# Patient Record
Sex: MALE | Race: BLACK OR AFRICAN AMERICAN | Employment: FULL TIME | URBAN - METROPOLITAN AREA
[De-identification: names, ages, dates, MRNs, and addresses within clinical notes are randomized per-mention and may not be internally consistent; named-entity substitution may affect disease eponyms.]

---

## 2017-03-11 ENCOUNTER — OFFICE VISIT (OUTPATIENT)
Dept: FAMILY MEDICINE CLINIC | Age: 32
End: 2017-03-11

## 2017-03-11 VITALS
HEIGHT: 71 IN | DIASTOLIC BLOOD PRESSURE: 64 MMHG | WEIGHT: 159 LBS | OXYGEN SATURATION: 100 % | HEART RATE: 62 BPM | SYSTOLIC BLOOD PRESSURE: 113 MMHG | TEMPERATURE: 97.8 F | BODY MASS INDEX: 22.26 KG/M2

## 2017-03-11 DIAGNOSIS — R06.02 SHORTNESS OF BREATH: Primary | ICD-10-CM

## 2017-03-11 DIAGNOSIS — J30.89 ALLERGY TO DUST: ICD-10-CM

## 2017-03-11 RX ORDER — LORATADINE 10 MG/1
10 TABLET ORAL DAILY
Qty: 30 TAB | Refills: 2 | Status: SHIPPED | OUTPATIENT
Start: 2017-03-11 | End: 2020-10-19

## 2017-03-11 RX ORDER — ALBUTEROL SULFATE 90 UG/1
2 AEROSOL, METERED RESPIRATORY (INHALATION)
Qty: 1 INHALER | Refills: 3
Start: 2017-03-11

## 2017-03-11 NOTE — PATIENT INSTRUCTIONS

## 2017-03-11 NOTE — PROGRESS NOTES
Patient seen for discharge to review his AVS, Albuterol and Loratadine prescriptions and the pharmacy location. We also discussed the procedure for obtaining prescriptions from [facility]. He prefers to use the [facility] location and was given the resource sheet for [facility] medication pick-up times. He states he does not have time today to complete the [facility] application, but will take it home and return it to a [facility] clinic next week as a Nurse Visit. He works each day until 2:30pm so he will try to meet with the SW/OW at a clinic if time allows. He recently started a new job and is paid bi-weekly. A copy of his 03-10-17 paycheck was made to send back to [facility] for Aminah Edouard. He understands that if he is not able to meet with a SW/OW on the day he returns the [facility] application, that they will call him to review the application information before sending it in to [facility]. He also understands that it may take three days for his prescription to be ready once [facility] receives his application and that they will call him to let him know that it is ready for pick-up. The patient was also given a Good Rx coupon card and is aware that the albuterol inhaler at [facility] with the coupon is about $54. We also discussed the walk-in x-ray procedure and the Care Card program. He was given list of [facility] facilities to go to for the x-ray as well as the resource sheet for the Care Card program. A copy of his 03-08-17 PFT results was made for the chart and the originals returned to the patient. He will go now to registration to schedule a provider follow-up appointment.  Alveta Blizzard, RN

## 2017-03-11 NOTE — PROGRESS NOTES
Assessment/Plan:    Kahlil was seen today for shortness of breath. Diagnoses and all orders for this visit:    Shortness of breath  -     XR CHEST PA LAT; Future  -     loratadine (CLARITIN) 10 mg tablet; Take 1 Tab by mouth daily. -     albuterol (PROVENTIL HFA, VENTOLIN HFA, PROAIR HFA) 90 mcg/actuation inhaler; Take 2 Puffs by inhalation every six (6) hours as needed for Wheezing. Allergy to dust  -     loratadine (CLARITIN) 10 mg tablet; Take 1 Tab by mouth daily. Continue to stay away from tobacco use and smoke  Wear respirator at work  Take loratadine to help with allergic sxs  Get albuterol from [facility] if he qualifies  RTC 1 month  Scan copy of recent pre albuterol and post albuterol PFT's into his chart    Follow-up Disposition:  Return in about 1 month (around 4/11/2017) for or sooner PRN. Madelyn Man PA-C  Kahlil Wardjamey expressed understanding of this plan. An AVS was printed and given to the patient.      ----------------------------------------------------------------------    Chief Complaint   Patient presents with    Shortness of Breath     referred from working well for SOB and chest pain evaluation. History of Present Illness:    29 yo presents to [facility] as a new pt, referred by occupational medicine after he was sent for respirator mask fitting and he had abnl results on his PFT. He has been using an albuterol inhaler for years- typically his use is once a day. He has had many ER visits over the years for SOB. He states that he has been dx'd with chronic bronchitis at the ER before. He has not had regular, routine primary care visits. He stopped smoking 2 weeks ago. His use was about 1-1 1/2 packs per day of cigarettes for about 16 years. Additionally, there was a lot of marijuana use during that time. He is now using a \"vap\" and overall since he stopped smoking he has been feeling better.    He often coughs at night and he thinks that his exposure to dust in the [employer] where he works has also contributed to the shortness of breath. He notes that the SOB is with heavy exertion and running or climbing ladders/stairs. As mentioned previously, he only uses 2 puffs of his albuterol once a day. His son has severe asthma and the pt will use his sons albuterol inhaler or he will buy one with the rx's he has received from the ER. Last cxr? [facility]    No past medical history on file. Current Outpatient Prescriptions   Medication Sig Dispense Refill    loratadine (CLARITIN) 10 mg tablet Take 1 Tab by mouth daily. 30 Tab 2    albuterol (PROVENTIL HFA, VENTOLIN HFA, PROAIR HFA) 90 mcg/actuation inhaler Take 2 Puffs by inhalation every six (6) hours as needed for Wheezing. 1 Inhaler 3       No Known Allergies    Social History   Substance Use Topics    Smoking status: Former Smoker    Smokeless tobacco: None    Alcohol use No       No family history on file.     Physical Exam:     Visit Vitals    /64 (BP 1 Location: Right arm, BP Patient Position: Sitting)    Pulse 62    Temp 97.8 °F (36.6 °C) (Oral)    Ht 5' 11.26\" (1.81 m)    Wt 159 lb (72.1 kg)    SpO2 100%    BMI 22.01 kg/m2   looks well, thin, pleasant, not SOB    A&Ox3  WDWN NAD  Respirations normal and non labored  Lungs are CTA alix today on exam  Cor RRR s1s2

## 2017-03-22 ENCOUNTER — DOCUMENTATION ONLY (OUTPATIENT)
Dept: FAMILY MEDICINE CLINIC | Age: 32
End: 2017-03-22

## 2019-11-04 ENCOUNTER — ED HISTORICAL/CONVERTED ENCOUNTER (OUTPATIENT)
Dept: OTHER | Age: 34
End: 2019-11-04

## 2020-10-19 ENCOUNTER — HOSPITAL ENCOUNTER (EMERGENCY)
Age: 35
Discharge: HOME | End: 2020-10-19
Attending: EMERGENCY MEDICINE
Payer: COMMERCIAL

## 2020-10-19 VITALS
WEIGHT: 172 LBS | HEART RATE: 72 BPM | TEMPERATURE: 98.3 F | DIASTOLIC BLOOD PRESSURE: 70 MMHG | SYSTOLIC BLOOD PRESSURE: 117 MMHG | HEIGHT: 73 IN | BODY MASS INDEX: 22.8 KG/M2 | RESPIRATION RATE: 16 BRPM | OXYGEN SATURATION: 98 %

## 2020-10-19 DIAGNOSIS — L72.3 SEBACEOUS CYST: Primary | ICD-10-CM

## 2020-10-19 DIAGNOSIS — D17.0 LIPOMA OF FACE: ICD-10-CM

## 2020-10-19 PROCEDURE — 99282 EMERGENCY DEPT VISIT SF MDM: CPT

## 2020-10-19 NOTE — ED PROVIDER NOTES
EMERGENCY DEPARTMENT HISTORY AND PHYSICAL EXAM      Date: 10/19/2020  Patient Name: Erin Puga      History of Presenting Illness     Chief Complaint   Patient presents with    Skin Problem       History Provided By: Patient    HPI: Erin Puga, 28 y.o. male with a past medical history significant No significant past medical history presents to the ED with cc of Facial cyst for 2.5 months    There are no other complaints, changes, or physical findings at this time. PCP: None    Current Outpatient Medications   Medication Sig Dispense Refill    albuterol (PROVENTIL HFA, VENTOLIN HFA, PROAIR HFA) 90 mcg/actuation inhaler Take 2 Puffs by inhalation every six (6) hours as needed for Wheezing. 1 Inhaler 3       Past History     Past Medical History:  No past medical history on file. Past Surgical History:  No past surgical history on file. Family History:  No family history on file. Social History:  Social History     Tobacco Use    Smoking status: Former Smoker    Smokeless tobacco: Never Used   Substance Use Topics    Alcohol use: No    Drug use: No       Allergies:  No Known Allergies      Review of Systems     Review of Systems   Constitutional: Negative. HENT:        Cyst between eyebrows and on nasal bridge getting bigger for 2.5 months. Saw PCP who treated for sinus infection but no better. Eyes: Negative. Respiratory: Negative. Cardiovascular: Negative. Gastrointestinal: Negative. Endocrine: Negative. Genitourinary: Negative. Musculoskeletal: Negative. Neurological: Negative. All other systems reviewed and are negative. Physical Exam     Physical Exam  Vitals signs and nursing note reviewed. Constitutional:       Appearance: Normal appearance. He is normal weight. HENT:      Head: Normocephalic. Comments: Cyst 2 cm soft, non tender non fluctuant cystic swelling which is mobile  Eyes:      Pupils: Pupils are equal, round, and reactive to light. Neck:      Musculoskeletal: Neck supple. Cardiovascular:      Rate and Rhythm: Normal rate and regular rhythm. Pulses: Normal pulses. Heart sounds: Normal heart sounds. Pulmonary:      Effort: Pulmonary effort is normal.      Breath sounds: Normal breath sounds. Skin:     General: Skin is warm and dry. Neurological:      General: No focal deficit present. Mental Status: He is alert and oriented to person, place, and time. Diagnostic Study Results     Labs -   No results found for this or any previous visit (from the past 12 hour(s)). Radiologic Studies -   [unfilled]  CT Results  (Last 48 hours)    None        CXR Results  (Last 48 hours)    None          Medical Decision Making and ED Course     Vital Signs-Reviewed the patient's vital signs. Patient Vitals for the past 12 hrs:   Temp Pulse Resp BP SpO2   10/19/20 1010 98.3 °F (36.8 °C) 72 16 117/70 98 %       Records Reviewed: Nursing Notes    The patient presents with       Provider Notes (Medical Decision Making):     [facility]       ED Course:     - I am the first and primary provider for this patient. - I reviewed the vital signs, available nursing notes, past medical history, past surgical history, family history and social history. Initial assessment performed. The patients presenting problems have been discussed, and they are in agreement with the care plan formulated and outlined with them. I have encouraged them to ask questions as they arise throughout their visit. CONSULTATIONS:          Dharmesh Mathew MD  Procedures               Coretat Mathew MD        Disposition       Discharged    Remove if not discharged  DISCHARGE PLAN:  1.    Current Discharge Medication List      CONTINUE these medications which have NOT CHANGED    Details   albuterol (PROVENTIL HFA, VENTOLIN HFA, PROAIR HFA) 90 mcg/actuation inhaler Take 2 Puffs by inhalation every six (6) hours as needed for Wheezing. Qty: 1 Inhaler, Refills: 3    Associated Diagnoses: Shortness of breath           2. Follow-up Information     Follow up With Specialties Details Why Contact Info    Belkis Sharma MD Surgery In 2 days  [address] [facility]  [phone]          3. Return to ED if worse   4. Current Discharge Medication List          Diagnosis     Clinical Impression:   1. Sebaceous cyst    2. Lipoma of face        Attestations:    Natalie Tim MD    Please note that this dictation was completed with [employer], the computer voice recognition software. Quite often unanticipated grammatical, syntax, homophones, and other interpretive errors are inadvertently transcribed by the computer software. Please disregard these errors. Please excuse any errors that have escaped final proofreading. Thank you.

## 2020-10-19 NOTE — ED NOTES
Dc instructions gone over with pt, pt verbalized understanding, pt ambulated out of ecc no distress noted

## 2021-10-05 ENCOUNTER — HOSPITAL ENCOUNTER (EMERGENCY)
Age: 36
Discharge: HOME | End: 2021-10-05
Attending: STUDENT IN AN ORGANIZED HEALTH CARE EDUCATION/TRAINING PROGRAM
Payer: COMMERCIAL

## 2021-10-05 ENCOUNTER — APPOINTMENT (OUTPATIENT)
Dept: GENERAL RADIOLOGY | Age: 36
End: 2021-10-05
Attending: STUDENT IN AN ORGANIZED HEALTH CARE EDUCATION/TRAINING PROGRAM
Payer: COMMERCIAL

## 2021-10-05 VITALS
SYSTOLIC BLOOD PRESSURE: 104 MMHG | HEART RATE: 66 BPM | BODY MASS INDEX: 23.3 KG/M2 | RESPIRATION RATE: 16 BRPM | WEIGHT: 172 LBS | HEIGHT: 72 IN | TEMPERATURE: 98.8 F | OXYGEN SATURATION: 99 % | DIASTOLIC BLOOD PRESSURE: 62 MMHG

## 2021-10-05 DIAGNOSIS — M62.830 BACK MUSCLE SPASM: Primary | ICD-10-CM

## 2021-10-05 LAB
ATRIAL RATE: 63 BPM
CALCULATED P AXIS, ECG09: 71 DEGREES
CALCULATED R AXIS, ECG10: 68 DEGREES
CALCULATED T AXIS, ECG11: 69 DEGREES
DIAGNOSIS, 93000: NORMAL
P-R INTERVAL, ECG05: 168 MS
Q-T INTERVAL, ECG07: 406 MS
QRS DURATION, ECG06: 86 MS
QTC CALCULATION (BEZET), ECG08: 415 MS
VENTRICULAR RATE, ECG03: 63 BPM

## 2021-10-05 PROCEDURE — 74011250637 HC RX REV CODE- 250/637: Performed by: STUDENT IN AN ORGANIZED HEALTH CARE EDUCATION/TRAINING PROGRAM

## 2021-10-05 PROCEDURE — 93005 ELECTROCARDIOGRAM TRACING: CPT

## 2021-10-05 PROCEDURE — 99283 EMERGENCY DEPT VISIT LOW MDM: CPT

## 2021-10-05 PROCEDURE — 71046 X-RAY EXAM CHEST 2 VIEWS: CPT

## 2021-10-05 RX ORDER — OXYCODONE AND ACETAMINOPHEN 5; 325 MG/1; MG/1
1 TABLET ORAL
Status: COMPLETED | OUTPATIENT
Start: 2021-10-05 | End: 2021-10-05

## 2021-10-05 RX ORDER — CYCLOBENZAPRINE HCL 10 MG
10 TABLET ORAL
Qty: 20 TABLET | Refills: 0 | Status: SHIPPED | OUTPATIENT
Start: 2021-10-05

## 2021-10-05 RX ORDER — CYCLOBENZAPRINE HCL 10 MG
10 TABLET ORAL
Status: COMPLETED | OUTPATIENT
Start: 2021-10-05 | End: 2021-10-05

## 2021-10-05 RX ORDER — IBUPROFEN 400 MG/1
600 TABLET ORAL
Qty: 20 TABLET | Refills: 0 | Status: SHIPPED | OUTPATIENT
Start: 2021-10-05

## 2021-10-05 RX ADMIN — OXYCODONE HYDROCHLORIDE AND ACETAMINOPHEN 1 TABLET: 5; 325 TABLET ORAL at 14:34

## 2021-10-05 RX ADMIN — CYCLOBENZAPRINE 10 MG: 10 TABLET, FILM COATED ORAL at 14:34

## 2021-10-05 NOTE — ED TRIAGE NOTES
Reports swelling below L scapula which he awakened c this AM. Reports history of back pain, muscle spasms etc. Person with him states he has been lightheaded for a couple of days. Denies vomiting or diarrhea. Denies any recent injury.

## 2021-10-05 NOTE — LETTER
Bobby Hernández was seen and treated in our emergency department on 10/5/2021. He may return to work on 10/7/21. If you have any questions or concerns, please don't hesitate to call.       Dr. Freddie Engel M.D.

## 2021-10-05 NOTE — LETTER
[facility] EMERGENCY DEPT  [facility] [place] [address]  [phone]    Work/School Note    Date: 10/5/2021    To Whom It May concern:    Sri Polanco was seen and treated today in the emergency room by the following provider(s):  Attending Provider: Alvin Beaulieu MD.      Sri Polanco is excused from work/school on 10/5/2021 through 10/8/2021. He is medically clear to return to work/school on 10/9/2021.         Sincerely,          Dr Arminda Bray

## 2021-10-05 NOTE — LETTER
Greald Jernigan was seen and treated in our emergency department on 10/5/2021. He may return to work on 10/8/21    If you have any questions or concerns, please don't hesitate to call.       Dr. Alessandra Marshall MD

## 2021-10-05 NOTE — ED PROVIDER NOTES
EMERGENCY DEPARTMENT HISTORY AND PHYSICAL EXAM      Date: 10/5/2021  Patient Name: Cari Rondon    History of Presenting Illness     Chief Complaint   Patient presents with    Back Pain       History Provided By: Patient    HPI: Cari Rondon, 39 y.o. male with a past medical history significant No significant past medical history presents to the ED with cc of left scapular pain. Patient states over the last 2 days has noticed worsening pain to the left scapula, radiating to left side of chest.  Patient denies any recent strain or heavy lifting. Patient states he has increased difficulty raising his arm secondary to pain. Patient denies any shortness of breath denies any fevers chills denies any abdominal pain or nausea vomiting. No known cardiac history, denies any drug alcohol or tobacco use. There are no other complaints, changes, or physical findings at this time. PCP: Cj Jean MD    No current facility-administered medications on file prior to encounter. Current Outpatient Medications on File Prior to Encounter   Medication Sig Dispense Refill    albuterol (PROVENTIL HFA, VENTOLIN HFA, PROAIR HFA) 90 mcg/actuation inhaler Take 2 Puffs by inhalation every six (6) hours as needed for Wheezing. 1 Inhaler 3       Past History     Past Medical History:  No past medical history on file. Past Surgical History:  No past surgical history on file. Family History:  No family history on file. Social History:  Social History     Tobacco Use    Smoking status: Former Smoker    Smokeless tobacco: Never Used   Substance Use Topics    Alcohol use: No    Drug use: No       Allergies:  No Known Allergies      Review of Systems     Review of Systems   Constitutional: Negative for activity change, appetite change, chills and fever. HENT: Negative for congestion, sore throat and trouble swallowing. Eyes: Negative for photophobia and visual disturbance.    Respiratory: Negative for cough, chest tightness and shortness of breath. Cardiovascular: Negative for chest pain and palpitations. Gastrointestinal: Negative for abdominal pain and nausea. Genitourinary: Negative for dysuria and flank pain. Musculoskeletal: Positive for arthralgias and back pain. Negative for neck pain. Skin: Negative for color change and pallor. Neurological: Negative for dizziness, weakness, numbness and headaches. Physical Exam     Physical Exam  Vitals and nursing note reviewed. Constitutional:       Appearance: Normal appearance. He is normal weight. HENT:      Head: Normocephalic and atraumatic. Nose: Nose normal.      Mouth/Throat:      Mouth: Mucous membranes are moist.   Eyes:      Extraocular Movements: Extraocular movements intact. Pupils: Pupils are equal, round, and reactive to light. Cardiovascular:      Rate and Rhythm: Normal rate and regular rhythm. Pulses: Normal pulses. Heart sounds: Normal heart sounds. No murmur heard. No friction rub. Pulmonary:      Effort: Pulmonary effort is normal.      Breath sounds: Normal breath sounds. Abdominal:      General: Abdomen is flat. Bowel sounds are normal.      Palpations: Abdomen is soft. Musculoskeletal:         General: No swelling or tenderness. Normal range of motion. Left upper arm: No swelling, edema or deformity. Cervical back: Normal range of motion and neck supple. Back:    Skin:     General: Skin is warm and dry. Capillary Refill: Capillary refill takes less than 2 seconds. Neurological:      General: No focal deficit present. Mental Status: He is alert and oriented to person, place, and time. Cranial Nerves: No cranial nerve deficit. Sensory: No sensory deficit. Motor: No weakness.    Psychiatric:         Mood and Affect: Mood normal.         Behavior: Behavior normal.         Diagnostic Study Results     Labs -     Recent Results (from the past 12 hour(s))   EKG, 12 LEAD, INITIAL    Collection Time: 10/05/21 11:52 AM   Result Value Ref Range    Ventricular Rate 63 BPM    Atrial Rate 63 BPM    P-R Interval 168 ms    QRS Duration 86 ms    Q-T Interval 406 ms    QTC Calculation (Bezet) 415 ms    Calculated P Axis 71 degrees    Calculated R Axis 68 degrees    Calculated T Axis 69 degrees    Diagnosis       Normal sinus rhythm  Normal ECG    Confirmed by Lottie Marin ([id number]) on 10/5/2021 5:16:40 PM         Radiologic Studies -   @lastxrresult@  CT Results  (Last 48 hours)    None        CXR Results  (Last 48 hours)               10/05/21 1226  XR CHEST PA LAT Final result    Impression:  No plain film evidence for CHF or pneumonia. Narrative:  Chest 2 views. Lungs clear; no interstitial or alveolar pulmonary edema. Cardiac and mediastinal structures within normal limits. No pneumothorax or sizable pleural effusion. Normal alignment of the thoracic vertebral column; no plain film evidence for   thoracic vertebral body compression deformity or excess degenerative change. Medical Decision Making   I am the first provider for this patient. I reviewed the vital signs, available nursing notes, past medical history, past surgical history, family history and social history. Vital Signs-Reviewed the patient's vital signs. Patient Vitals for the past 12 hrs:   Temp Pulse Resp BP SpO2   10/05/21 1147 98.8 °F (37.1 °C) 66 16 104/62 99 %       EKG: Normal sinus rhythm 63, no ST elevations, normal axis      Records Reviewed: Nursing Notes and Old Medical Records    The patient presents with back pain, scapular pain with a differential diagnosis of muscle spasm, periscapular muscular strain, scapular injury, pneumothorax, PE, ACS      Provider Notes (Medical Decision Making):     MDM   55-year-old male, no significant past medical history presents to emergency department for 2 days of left scapular pain.     Patient denies any recent injury denies any recent heavy lifting or strain. Physical exam shows uncomfortable male, however no distress, stable vitals, clear breath sounds bilateral normal cardiovascular exam, intact distal pulses. Left medial border of scapula is significantly tender to any palpation, full range of motion of left shoulder however patient cannot range secondary to pain in scapula. Patient has no cardiac illness, no risk factors for cardiac disease, EKG shows no signs of ischemic changes. Patient's well score 0, PERC negative, do not feel the patient requires blood work at this time. Chest x-ray ordered at triage, reviewed by myself and radiology no signs of acute cardiopulmonary injury, no pneumothorax no pleural effusion normal cardiac shadow    We will treat patient for muscle spasm with Flexeril and Percocet. ED Course:   Initial assessment performed. The patients presenting problems have been discussed, and they are in agreement with the care plan formulated and outlined with them. I have encouraged them to ask questions as they arise throughout their visit. PROCEDURES  Procedures         PLAN:  1. Discharge Medication List as of 10/5/2021  2:56 PM      START taking these medications    Details   lidocaine HCL 4 % ptmd 1 Patch by Apply Externally route two (2) times a day., Normal, Disp-12 Each, R-0      cyclobenzaprine (FLEXERIL) 10 mg tablet Take 1 Tablet by mouth three (3) times daily as needed for Muscle Spasm(s). , Normal, Disp-20 Tablet, R-0      ibuprofen (MOTRIN) 400 mg tablet Take 1.5 Tablets by mouth every six (6) hours as needed for Pain., Normal, Disp-20 Tablet, R-0         CONTINUE these medications which have NOT CHANGED    Details   albuterol (PROVENTIL HFA, VENTOLIN HFA, PROAIR HFA) 90 mcg/actuation inhaler Take 2 Puffs by inhalation every six (6) hours as needed for Wheezing., No Print, Disp-1 Inhaler, R-3           2.    Follow-up Information     Follow up With Specialties Details Why Contact Juarez Amaya MD Internal Medicine   [address] [id number]  [phone]      [facility] EMERGENCY DEPT Emergency Medicine  If symptoms worsen Jacoby Mckinney   [phone]        Return to ED if worse     Diagnosis     Clinical Impression:   1.  Back muscle spasm

## 2021-12-21 ENCOUNTER — TRANSCRIBE ORDER (OUTPATIENT)
Dept: REGISTRATION | Age: 36
End: 2021-12-21

## 2021-12-21 ENCOUNTER — HOSPITAL ENCOUNTER (OUTPATIENT)
Dept: GENERAL RADIOLOGY | Age: 36
Discharge: HOME | End: 2021-12-21
Payer: COMMERCIAL

## 2021-12-21 DIAGNOSIS — M25.512 LEFT SHOULDER PAIN: Primary | ICD-10-CM

## 2021-12-21 DIAGNOSIS — M25.512 LEFT SHOULDER PAIN: ICD-10-CM

## 2021-12-21 PROCEDURE — 73030 X-RAY EXAM OF SHOULDER: CPT

## 2022-05-11 ENCOUNTER — HOSPITAL ENCOUNTER (EMERGENCY)
Age: 37
Discharge: HOME | End: 2022-05-11
Attending: EMERGENCY MEDICINE
Payer: COMMERCIAL

## 2022-05-11 VITALS
BODY MASS INDEX: 22.08 KG/M2 | HEART RATE: 88 BPM | HEIGHT: 72 IN | WEIGHT: 163 LBS | DIASTOLIC BLOOD PRESSURE: 76 MMHG | OXYGEN SATURATION: 99 % | TEMPERATURE: 98.4 F | RESPIRATION RATE: 18 BRPM | SYSTOLIC BLOOD PRESSURE: 123 MMHG

## 2022-05-11 DIAGNOSIS — G57.03 COMPRESSION OF SCIATIC NERVE, BILATERAL: Primary | ICD-10-CM

## 2022-05-11 PROCEDURE — 74011250637 HC RX REV CODE- 250/637: Performed by: EMERGENCY MEDICINE

## 2022-05-11 PROCEDURE — 99283 EMERGENCY DEPT VISIT LOW MDM: CPT

## 2022-05-11 RX ORDER — NAPROXEN 500 MG/1
500 TABLET ORAL
Status: COMPLETED | OUTPATIENT
Start: 2022-05-11 | End: 2022-05-11

## 2022-05-11 RX ORDER — NAPROXEN 500 MG/1
500 TABLET ORAL 2 TIMES DAILY WITH MEALS
Qty: 20 TABLET | Refills: 0 | Status: SHIPPED | OUTPATIENT
Start: 2022-05-11 | End: 2022-05-21

## 2022-05-11 RX ORDER — METHOCARBAMOL 500 MG/1
750 TABLET, FILM COATED ORAL
Status: COMPLETED | OUTPATIENT
Start: 2022-05-11 | End: 2022-05-11

## 2022-05-11 RX ORDER — METHYLPREDNISOLONE 4 MG/1
TABLET ORAL
Qty: 1 DOSE PACK | Refills: 0 | Status: SHIPPED | OUTPATIENT
Start: 2022-05-11

## 2022-05-11 RX ORDER — METHOCARBAMOL 500 MG/1
500 TABLET, FILM COATED ORAL 3 TIMES DAILY
Qty: 20 TABLET | Refills: 0 | Status: SHIPPED | OUTPATIENT
Start: 2022-05-11

## 2022-05-11 RX ADMIN — NAPROXEN 500 MG: 500 TABLET ORAL at 12:48

## 2022-05-11 RX ADMIN — METHOCARBAMOL 750 MG: 500 TABLET ORAL at 12:48

## 2022-05-11 NOTE — Clinical Note
[facility] EMERGENCY DEPARTMENT  [address] [facility] [address]  [phone]    Work/School Note    Date: 5/11/2022    To Whom It May concern:      Amadeo Gaviria was seen and treated today in the emergency room by the following provider(s):  Attending Provider: Natalia Jhaveri MD.      Amadeo Gaviria is excused from work/school on 05/11/22. He is clear to return to work/school on 05/12/22.         Sincerely,          Fatimah Frost MD

## 2022-05-11 NOTE — Clinical Note
[facility] [facility] EMERGENCY DEPARTMENT  [address] [facility] [address]  [phone]    Work/School Note    Date: 5/11/2022    To Whom It May concern:    Kelsey Haddad was seen and treated today in the emergency room by the following provider(s):  Attending Provider: Vicky Cohen MD.      Kelsey Haddad is excused from work/school on 05/11/22 and 05/12/22. He is medically clear to return to work/school on 5/13/2022.        Sincerely,          John Cartagena MD

## 2022-05-11 NOTE — ED TRIAGE NOTES
Pt reports that he has been having lower back pain that has been radiating down both of his legs. Pt reports that the pain also radiates to his groin.

## 2022-05-11 NOTE — ED PROVIDER NOTES
EMERGENCY DEPARTMENT HISTORY AND PHYSICAL EXAM      Date: 5/11/2022  Patient Name: Kenya Desai  Patient Age and Sex: 40 y.o. male     History of Presenting Illness     Chief Complaint   Patient presents with    Back Pain       History Provided By: Patient    HPI: Kenya Desai is a 80-year-old male with a history of chronic back pain, sciatica, presenting with back pain. Patient states that for the past 3 days has been having lower back pain and spasms bilaterally with pain radiating down his bilateral legs. 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 Denies any trauma to his back. States that the pain is also radiating not just his legs but also down into his groin. He denies any saddle anesthesia, dysuria, fevers. Has been taking Motrin and applying heat and doing exercise. Sees his primary care doctor regularly but does not have a dedicated spine physician. States this all started after football injury many years ago    There are no other complaints, changes, or physical findings at this time. PCP: Bi Zuniga MD    No current facility-administered medications on file prior to encounter. Current Outpatient Medications on File Prior to Encounter   Medication Sig Dispense Refill    lidocaine HCL 4 % ptmd 1 Patch by Apply Externally route two (2) times a day. 12 Each 0    cyclobenzaprine (FLEXERIL) 10 mg tablet Take 1 Tablet by mouth three (3) times daily as needed for Muscle Spasm(s). 20 Tablet 0    ibuprofen (MOTRIN) 400 mg tablet Take 1.5 Tablets by mouth every six (6) hours as needed for Pain. 20 Tablet 0    albuterol (PROVENTIL HFA, VENTOLIN HFA, PROAIR HFA) 90 mcg/actuation inhaler Take 2 Puffs by inhalation every six (6) hours as needed for Wheezing. 1 Inhaler 3       Past History     Past Medical History:  History reviewed. No pertinent past medical history. Past Surgical History:  History reviewed.  No pertinent surgical history. Family History:  History reviewed. No pertinent family history. Social History:  Social History     Tobacco Use    Smoking status: Former Smoker    Smokeless tobacco: Never Used   Substance Use Topics    Alcohol use: No    Drug use: Yes     Types: Marijuana       Allergies:  No Known Allergies      Review of Systems   Review of Systems   Constitutional: Negative for chills and fever. Respiratory: Negative for cough and shortness of breath. Cardiovascular: Negative for chest pain. Gastrointestinal: Negative for abdominal pain, constipation, diarrhea, nausea and vomiting. Genitourinary: Negative for dysuria, frequency and hematuria. Musculoskeletal: Positive for back pain and myalgias. Neurological: Positive for numbness. Negative for weakness. All other systems reviewed and are negative. Physical Exam   Physical Exam  Vitals and nursing note reviewed. Constitutional:       Appearance: He is well-developed. HENT:      Head: Normocephalic and atraumatic. Nose: Nose normal.      Mouth/Throat:      Mouth: Mucous membranes are moist.   Eyes:      Extraocular Movements: Extraocular movements intact. Conjunctiva/sclera: Conjunctivae normal.   Cardiovascular:      Rate and Rhythm: Normal rate and regular rhythm. Pulmonary:      Effort: Pulmonary effort is normal. No respiratory distress. Breath sounds: Normal breath sounds. Abdominal:      General: There is no distension. Palpations: Abdomen is soft. Tenderness: There is no abdominal tenderness. Musculoskeletal:         General: Normal range of motion. Cervical back: Normal range of motion and neck supple. Comments: Patient able to ambulate. Positive straight leg sign on bilateral legs. Some tenderness over the right lower back no tenderness over the spine   Skin:     General: Skin is warm and dry. Neurological:      General: No focal deficit present.       Mental Status: He is alert and oriented to person, place, and time. Mental status is at baseline. Psychiatric:         Mood and Affect: Mood normal.          Diagnostic Study Results     Labs -   No results found for this or any previous visit (from the past 12 hour(s)). Radiologic Studies -   No orders to display     CT Results  (Last 48 hours)    None        CXR Results  (Last 48 hours)    None            Medical Decision Making   I am the first provider for this patient. I reviewed the vital signs, available nursing notes, past medical history, past surgical history, family history and social history. Vital Signs-Reviewed the patient's vital signs. Patient Vitals for the past 12 hrs:   Temp Pulse Resp BP SpO2   05/11/22 1226 98.4 °F (36.9 °C) 88 18 123/76 99 %       Records Reviewed: Nursing Notes and Old Medical Records    Provider Notes (Medical Decision Making): The patient complains of low back pain radiating down leg with positive straight leg sign. These symptoms are consistent with sciatica. Less likely  pathology, aortic dissection or AAA, or cauda equina given that there are no red flags and a benign physical exam.     I have recommended rest, avoiding heavy lifting until better, use of intermittent heat (avoid sleeping on a heating pad), and use of OTC NSAID's (Advil, Aleve etc) or Tylenol prn for pain and steroids PRN. Given list of sciatica Exercises. Call PCP if back pain persists or he develops leg symptoms. It has also been explained that this may take up to three months to fully resolved and that smoking may slow that process even more. ED Course:   Initial assessment performed. The patients presenting problems have been discussed, and they are in agreement with the care plan formulated and outlined with them. I have encouraged them to ask questions as they arise throughout their visit.        Critical Care Time:   0    Disposition:  Discharge Note:  The patient has been re-evaluated and is ready for discharge. Reviewed available results with patient. Counseled patient on diagnosis and care plan. Patient has expressed understanding, and all questions have been answered. Patient agrees with plan and agrees to follow up as recommended, or to return to the ED if their symptoms worsen. Discharge instructions have been provided and explained to the patient, along with reasons to return to the ED. PLAN:  Current Discharge Medication List      START taking these medications    Details   methocarbamoL (ROBAXIN) 500 mg tablet Take 1 Tablet by mouth three (3) times daily. Qty: 20 Tablet, Refills: 0  Start date: 5/11/2022      naproxen (Naprosyn) 500 mg tablet Take 1 Tablet by mouth two (2) times daily (with meals) for 10 days. Qty: 20 Tablet, Refills: 0  Start date: 5/11/2022, End date: 5/21/2022      methylPREDNISolone (Medrol, Hang,) 4 mg tablet As directed  Qty: 1 Dose Pack, Refills: 0  Start date: 5/11/2022         1.   2.   Follow-up Information     Follow up With Specialties Details Why Contact Info    Ching Harry MD Internal Medicine Physician  As needed [address]   [phone]      George Kam MD Orthopedic Surgery Schedule an appointment as soon as possible for a visit   [address] [place] [address]  [phone]          3. Return to ED if worse     Diagnosis     Clinical Impression:   1. Compression of sciatic nerve, bilateral        Attestations:    Fatimah Frost M.D. Please note that this dictation was completed with [employer], the computer voice recognition software. Quite often unanticipated grammatical, syntax, homophones, and other interpretive errors are inadvertently transcribed by the computer software. Please disregard these errors. Please excuse any errors that have escaped final proofreading. Thank you.

## 2022-05-20 ENCOUNTER — APPOINTMENT (OUTPATIENT)
Dept: GENERAL RADIOLOGY | Age: 37
End: 2022-05-20
Attending: STUDENT IN AN ORGANIZED HEALTH CARE EDUCATION/TRAINING PROGRAM
Payer: COMMERCIAL

## 2022-05-20 ENCOUNTER — HOSPITAL ENCOUNTER (EMERGENCY)
Age: 37
Discharge: HOME | End: 2022-05-20
Attending: STUDENT IN AN ORGANIZED HEALTH CARE EDUCATION/TRAINING PROGRAM
Payer: COMMERCIAL

## 2022-05-20 VITALS
RESPIRATION RATE: 15 BRPM | DIASTOLIC BLOOD PRESSURE: 62 MMHG | HEART RATE: 78 BPM | SYSTOLIC BLOOD PRESSURE: 100 MMHG | BODY MASS INDEX: 22.08 KG/M2 | WEIGHT: 163 LBS | TEMPERATURE: 98.6 F | HEIGHT: 72 IN | OXYGEN SATURATION: 99 %

## 2022-05-20 DIAGNOSIS — R07.89 CHEST DISCOMFORT: Primary | ICD-10-CM

## 2022-05-20 DIAGNOSIS — R20.0 ARM NUMBNESS LEFT: ICD-10-CM

## 2022-05-20 PROCEDURE — 93005 ELECTROCARDIOGRAM TRACING: CPT

## 2022-05-20 PROCEDURE — 99284 EMERGENCY DEPT VISIT MOD MDM: CPT

## 2022-05-20 PROCEDURE — 71045 X-RAY EXAM CHEST 1 VIEW: CPT

## 2022-05-21 LAB
ATRIAL RATE: 66 BPM
CALCULATED P AXIS, ECG09: 57 DEGREES
CALCULATED R AXIS, ECG10: 57 DEGREES
CALCULATED T AXIS, ECG11: 68 DEGREES
DIAGNOSIS, 93000: NORMAL
P-R INTERVAL, ECG05: 160 MS
Q-T INTERVAL, ECG07: 408 MS
QRS DURATION, ECG06: 90 MS
QTC CALCULATION (BEZET), ECG08: 427 MS
VENTRICULAR RATE, ECG03: 66 BPM

## 2022-05-21 NOTE — ED PROVIDER NOTES
EMERGENCY DEPARTMENT HISTORY AND PHYSICAL EXAM      Date: 5/20/2022  Patient Name: Amadeo Gaviria    History of Presenting Illness     Chief Complaint   Patient presents with    Arm Pain       History Provided By: Patient    HPI: Amadeo Gaviria, 40 y.o. male with a past medical history significant No significant past medical history presents to the ED with cc of chest heaviness, arm pain. Patient states over the last week he has had intermittent numbness tingling to his left arm with chest pressure sensation. Patient denies any exacerbating or alleviating factors nonexertional.  Patient states sometimes feels numbness to his right arm as well. No known cardiac history, denies any alcohol or drug use. There are no other complaints, changes, or physical findings at this time. PCP: Ching Harry MD    No current facility-administered medications on file prior to encounter. Current Outpatient Medications on File Prior to Encounter   Medication Sig Dispense Refill    methocarbamoL (ROBAXIN) 500 mg tablet Take 1 Tablet by mouth three (3) times daily. 20 Tablet 0    naproxen (Naprosyn) 500 mg tablet Take 1 Tablet by mouth two (2) times daily (with meals) for 10 days. 20 Tablet 0    methylPREDNISolone (Medrol, Hang,) 4 mg tablet As directed 1 Dose Pack 0    lidocaine HCL 4 % ptmd 1 Patch by Apply Externally route two (2) times a day. 12 Each 0    cyclobenzaprine (FLEXERIL) 10 mg tablet Take 1 Tablet by mouth three (3) times daily as needed for Muscle Spasm(s). 20 Tablet 0    ibuprofen (MOTRIN) 400 mg tablet Take 1.5 Tablets by mouth every six (6) hours as needed for Pain. 20 Tablet 0    albuterol (PROVENTIL HFA, VENTOLIN HFA, PROAIR HFA) 90 mcg/actuation inhaler Take 2 Puffs by inhalation every six (6) hours as needed for Wheezing. 1 Inhaler 3       Past History     Past Medical History:  No past medical history on file. Past Surgical History:  No past surgical history on file.     Family History:  No family history on file. Social History:  Social History     Tobacco Use    Smoking status: Former Smoker    Smokeless tobacco: Never Used   Vaping Use    Vaping Use: Never used   Substance Use Topics    Alcohol use: No    Drug use: Yes     Types: Marijuana       Allergies:  No Known Allergies      Review of Systems     Review of Systems   Constitutional: Negative for activity change, appetite change, chills and fever. HENT: Negative for congestion, sore throat and trouble swallowing. Eyes: Negative for photophobia and visual disturbance. Respiratory: Positive for chest tightness. Negative for cough and shortness of breath. Cardiovascular: Negative for chest pain and palpitations. Gastrointestinal: Negative for abdominal pain and nausea. Genitourinary: Negative for dysuria and flank pain. Musculoskeletal: Positive for myalgias. Negative for arthralgias and neck pain. Skin: Negative for color change and pallor. Neurological: Positive for dizziness. Negative for weakness, numbness and headaches. Physical Exam     Physical Exam  Vitals and nursing note reviewed. Constitutional:       Appearance: Normal appearance. He is normal weight. HENT:      Head: Normocephalic and atraumatic. Nose: Nose normal.      Mouth/Throat:      Mouth: Mucous membranes are moist.   Eyes:      Extraocular Movements: Extraocular movements intact. Pupils: Pupils are equal, round, and reactive to light. Cardiovascular:      Rate and Rhythm: Normal rate and regular rhythm. Pulses: Normal pulses. Heart sounds: Normal heart sounds. Pulmonary:      Effort: Pulmonary effort is normal.      Breath sounds: Normal breath sounds. Abdominal:      General: Abdomen is flat. Bowel sounds are normal.      Palpations: Abdomen is soft. Musculoskeletal:         General: No swelling or tenderness. Normal range of motion. Cervical back: Normal range of motion and neck supple.    Skin: General: Skin is warm and dry. Capillary Refill: Capillary refill takes less than 2 seconds. Neurological:      General: No focal deficit present. Mental Status: He is alert and oriented to person, place, and time. Cranial Nerves: No cranial nerve deficit. Sensory: No sensory deficit. Motor: No weakness. Psychiatric:         Mood and Affect: Mood normal.         Behavior: Behavior normal.         Diagnostic Study Results     Labs -   No results found for this or any previous visit (from the past 12 hour(s)). Radiologic Studies -   @lastxrresult@  CT Results  (Last 48 hours)    None        CXR Results  (Last 48 hours)               05/20/22 2230  XR CHEST PORT Final result    Impression:  Negative. Narrative:  Upright frontal view of the chest compared to 10/5/2021. Heart size is normal.   Lungs are clear of infiltrate. No pneumothorax or pleural effusion is seen. No   bony abnormalities are identified. Medical Decision Making   I am the first provider for this patient. I reviewed the vital signs, available nursing notes, past medical history, past surgical history, family history and social history. Vital Signs-Reviewed the patient's vital signs. Patient Vitals for the past 12 hrs:   Temp Pulse Resp BP SpO2   05/20/22 2139 98.6 °F (37 °C) 78 15 100/62 99 %     EKG: Normal sinus rhythm 66, no ST elevation, normal axis    The patient is considered to be LOW RISK for pulmonary embolism by Well's Criteria. In addition the patient is PERC NEGATIVE and therefore does not require additional testing. (Ref: Evaluation of patients with suspected acute pulmonary embolism: Best Practice advice from the clinical guidelines committee of the [employer].  [phone]): [phone].)      Records Reviewed: Nursing Notes    The patient presents with weakness, dizziness, left arm numbness with a differential diagnosis of ACS, generalized weakness, neuropathy      Provider Notes (Medical Decision Making):     MDM   58-year-old male, no significant past medical history presents emergency department for generalized weakness, dizziness, left arm numbness. Physical exam shows well-appearing male no distress, normal cardiovascular exam, no focal neurological deficits no appreciable weakness or sensation discrepancy over extremities. Checks x-ray shows no signs of acute cardiopulmonary pathology, EKG shows no acute ischemia changes no arrhythmias. Discussed results with patient at this time do not feel patient has significant acute medical process causing symptoms, feel patient can be discharged home with primary care follow-up, return precautions discussed with patient. ED Course:   Initial assessment performed. The patients presenting problems have been discussed, and they are in agreement with the care plan formulated and outlined with them. I have encouraged them to ask questions as they arise throughout their visit. PROCEDURES  Procedures         PLAN:  1. Discharge Medication List as of 5/20/2022 11:08 PM        2. Follow-up Information     Follow up With Specialties Details Why Janet Chavez MD Internal Medicine Physician  As needed [address] [facility]  [phone]      Kirk Garsia MD Neurology In 1 week If symptoms worsen [address] [facility] [address] [facility] [birth year]  [phone]          Return to ED if worse     Diagnosis     Clinical Impression:   1. Chest discomfort    2.  Arm numbness left

## 2022-05-21 NOTE — ED TRIAGE NOTES
Pt her for left arm, sometime right arm discomfort. .. Denies true pain  No swelling or deformity noted.   AO x's 4

## 2023-04-21 ENCOUNTER — APPOINTMENT (OUTPATIENT)
Dept: GENERAL RADIOLOGY | Age: 38
End: 2023-04-21
Attending: EMERGENCY MEDICINE

## 2023-04-21 ENCOUNTER — HOSPITAL ENCOUNTER (EMERGENCY)
Age: 38
Discharge: HOME | End: 2023-04-21
Attending: EMERGENCY MEDICINE

## 2023-04-21 VITALS
WEIGHT: 175 LBS | HEART RATE: 97 BPM | OXYGEN SATURATION: 99 % | SYSTOLIC BLOOD PRESSURE: 136 MMHG | DIASTOLIC BLOOD PRESSURE: 89 MMHG | BODY MASS INDEX: 23.19 KG/M2 | RESPIRATION RATE: 19 BRPM | HEIGHT: 73 IN | TEMPERATURE: 97.4 F

## 2023-04-21 DIAGNOSIS — S52.202A CLOSED FRACTURE OF SHAFT OF LEFT ULNA, UNSPECIFIED FRACTURE MORPHOLOGY, INITIAL ENCOUNTER: Primary | ICD-10-CM

## 2023-04-21 PROCEDURE — 73080 X-RAY EXAM OF ELBOW: CPT

## 2023-04-21 PROCEDURE — 99284 EMERGENCY DEPT VISIT MOD MDM: CPT

## 2023-04-21 PROCEDURE — 73110 X-RAY EXAM OF WRIST: CPT

## 2023-04-21 PROCEDURE — 73090 X-RAY EXAM OF FOREARM: CPT

## 2023-04-21 PROCEDURE — 74011250636 HC RX REV CODE- 250/636: Performed by: EMERGENCY MEDICINE

## 2023-04-21 PROCEDURE — 73130 X-RAY EXAM OF HAND: CPT

## 2023-04-21 PROCEDURE — 74011250637 HC RX REV CODE- 250/637: Performed by: EMERGENCY MEDICINE

## 2023-04-21 PROCEDURE — 75810000053 HC SPLINT APPLICATION

## 2023-04-21 PROCEDURE — 90714 TD VACC NO PRESV 7 YRS+ IM: CPT | Performed by: EMERGENCY MEDICINE

## 2023-04-21 PROCEDURE — 90471 IMMUNIZATION ADMIN: CPT

## 2023-04-21 RX ORDER — HYDROCODONE BITARTRATE AND ACETAMINOPHEN 5; 325 MG/1; MG/1
1 TABLET ORAL
Qty: 11 TABLET | Refills: 0 | Status: SHIPPED | OUTPATIENT
Start: 2023-04-21 | End: 2023-04-24

## 2023-04-21 RX ORDER — HYDROCODONE BITARTRATE AND ACETAMINOPHEN 5; 325 MG/1; MG/1
1 TABLET ORAL ONCE
Status: COMPLETED | OUTPATIENT
Start: 2023-04-21 | End: 2023-04-21

## 2023-04-21 RX ADMIN — HYDROCODONE BITARTRATE AND ACETAMINOPHEN 1 TABLET: 5; 325 TABLET ORAL at 12:21

## 2023-04-21 RX ADMIN — CLOSTRIDIUM TETANI TOXOID ANTIGEN (FORMALDEHYDE INACTIVATED) AND CORYNEBACTERIUM DIPHTHERIAE TOXOID ANTIGEN (FORMALDEHYDE INACTIVATED) 0.5 ML: 5; 2 INJECTION, SUSPENSION INTRAMUSCULAR at 12:21

## 2023-04-21 NOTE — ED TRIAGE NOTES
Patient arrived via POV with sling on left arm. Patient c/o severe left arm pain after arm got caught in machine at work.  No open wounds, small abrasion on upper arm and wrist. Limited range of motion

## 2023-04-21 NOTE — ED PROVIDER NOTES
40-year-old without any prior medical history resents emergency department complaining of left arm pain. He was at work when a right after his left hand was caught in some machinery, a conveyor belt and his arm was twisted. Complains of pain to his elbow down on the left side. Has superficial abrasions to the forearm as well    The history is provided by the patient and medical records. Arm Injury   This is a new problem. No past medical history on file. No past surgical history on file. No family history on file. Social History     Socioeconomic History    Marital status:      Spouse name: Not on file    Number of children: Not on file    Years of education: Not on file    Highest education level: Not on file   Occupational History    Not on file   Tobacco Use    Smoking status: Former    Smokeless tobacco: Never   Vaping Use    Vaping Use: Never used   Substance and Sexual Activity    Alcohol use: No    Drug use: Yes     Types: Marijuana    Sexual activity: Not on file   Other Topics Concern    Not on file   Social History Narrative    Not on file     Social Determinants of Health     Financial Resource Strain: Not on file   Food Insecurity: Not on file   Transportation Needs: Not on file   Physical Activity: Not on file   Stress: Not on file   Social Connections: Not on file   Intimate Partner Violence: Not on file   Housing Stability: Not on file         ALLERGIES: Patient has no known allergies. Review of Systems    Vitals:    04/21/23 1149 04/21/23 1149   BP:  136/89   Pulse:  97   Resp:  19   Temp:  97.4 °F (36.3 °C)   SpO2:  99%   Weight: 79.4 kg (175 lb)    Height: 6' 1\" (1.854 m)             Physical Exam  Vitals and nursing note reviewed. Constitutional:       Appearance: Normal appearance. Musculoskeletal:         General: Tenderness present. No deformity. Comments: Tenderness to palpation from the elbow down. There are no deformities.   There are superficial abrasions to the distal forearm, distally NVI   Neurological:      Mental Status: He is alert. Medical Decision Making  51-year-old presents as above with injury to his left forearm. Patient demonstrates nondisplaced mid ulnar fracture. Will place in splint and sling, follow-up with orthopedics. Amount and/or Complexity of Data Reviewed  Radiology: ordered and independent interpretation performed. Decision-making details documented in ED Course. Risk  Prescription drug management. ED Course as of 04/21/23 1305   Fri Apr 21, 2023   1225 I have independently viewed the obtained radiographic images and note x-rays of the left arm with left ulnar fracture, nondisplaced. Will await radiology read.  [JM]      ED Course User Index  [JM] Lesly Woody MD       Procedures

## 2023-11-13 ENCOUNTER — HOSPITAL ENCOUNTER (EMERGENCY)
Facility: HOSPITAL | Age: 38
Discharge: HOME | End: 2023-11-13
Payer: COMMERCIAL

## 2023-11-13 ENCOUNTER — APPOINTMENT (OUTPATIENT)
Facility: HOSPITAL | Age: 38
End: 2023-11-13
Payer: COMMERCIAL

## 2023-11-13 VITALS
TEMPERATURE: 98.8 F | BODY MASS INDEX: 23.16 KG/M2 | OXYGEN SATURATION: 100 % | DIASTOLIC BLOOD PRESSURE: 60 MMHG | HEART RATE: 80 BPM | SYSTOLIC BLOOD PRESSURE: 107 MMHG | WEIGHT: 171 LBS | RESPIRATION RATE: 19 BRPM | HEIGHT: 72 IN

## 2023-11-13 DIAGNOSIS — M25.512 ACUTE PAIN OF LEFT SHOULDER: ICD-10-CM

## 2023-11-13 DIAGNOSIS — G89.29 CHRONIC PAIN OF LEFT UPPER EXTREMITY: ICD-10-CM

## 2023-11-13 DIAGNOSIS — M79.602 CHRONIC PAIN OF LEFT UPPER EXTREMITY: ICD-10-CM

## 2023-11-13 DIAGNOSIS — M79.602 MUSCULOSKELETAL PAIN OF LEFT UPPER EXTREMITY: Primary | ICD-10-CM

## 2023-11-13 DIAGNOSIS — M54.2 CERVICAL MUSCLE PAIN: ICD-10-CM

## 2023-11-13 LAB
EKG ATRIAL RATE: 66 BPM
EKG DIAGNOSIS: NORMAL
EKG P AXIS: 78 DEGREES
EKG P-R INTERVAL: 152 MS
EKG Q-T INTERVAL: 414 MS
EKG QRS DURATION: 90 MS
EKG QTC CALCULATION (BAZETT): 434 MS
EKG R AXIS: 55 DEGREES
EKG T AXIS: 69 DEGREES
EKG VENTRICULAR RATE: 66 BPM

## 2023-11-13 PROCEDURE — 93005 ELECTROCARDIOGRAM TRACING: CPT | Performed by: NURSE PRACTITIONER

## 2023-11-13 PROCEDURE — 99284 EMERGENCY DEPT VISIT MOD MDM: CPT

## 2023-11-13 PROCEDURE — 71046 X-RAY EXAM CHEST 2 VIEWS: CPT

## 2023-11-13 PROCEDURE — 6370000000 HC RX 637 (ALT 250 FOR IP): Performed by: NURSE PRACTITIONER

## 2023-11-13 RX ORDER — PREDNISONE 50 MG/1
50 TABLET ORAL DAILY
Qty: 5 TABLET | Refills: 0 | Status: SHIPPED | OUTPATIENT
Start: 2023-11-13 | End: 2023-11-18

## 2023-11-13 RX ORDER — OXYCODONE HYDROCHLORIDE AND ACETAMINOPHEN 5; 325 MG/1; MG/1
1 TABLET ORAL
Status: COMPLETED | OUTPATIENT
Start: 2023-11-13 | End: 2023-11-13

## 2023-11-13 RX ORDER — METHOCARBAMOL 500 MG/1
1000 TABLET, FILM COATED ORAL ONCE
Status: COMPLETED | OUTPATIENT
Start: 2023-11-13 | End: 2023-11-13

## 2023-11-13 RX ORDER — OXYCODONE HYDROCHLORIDE AND ACETAMINOPHEN 5; 325 MG/1; MG/1
1 TABLET ORAL EVERY 6 HOURS PRN
Qty: 12 TABLET | Refills: 0 | Status: SHIPPED | OUTPATIENT
Start: 2023-11-13 | End: 2023-11-16

## 2023-11-13 RX ORDER — CYCLOBENZAPRINE HCL 5 MG
5 TABLET ORAL 2 TIMES DAILY PRN
Qty: 10 TABLET | Refills: 0 | Status: SHIPPED | OUTPATIENT
Start: 2023-11-13 | End: 2023-11-23

## 2023-11-13 RX ORDER — IBUPROFEN 600 MG/1
600 TABLET ORAL
Status: COMPLETED | OUTPATIENT
Start: 2023-11-13 | End: 2023-11-13

## 2023-11-13 RX ORDER — LIDOCAINE 4 G/G
1 PATCH TOPICAL
Status: DISCONTINUED | OUTPATIENT
Start: 2023-11-13 | End: 2023-11-13 | Stop reason: HOSPADM

## 2023-11-13 RX ADMIN — METHOCARBAMOL TABLETS 1000 MG: 500 TABLET, COATED ORAL at 16:34

## 2023-11-13 RX ADMIN — PREDNISONE 50 MG: 20 TABLET ORAL at 16:33

## 2023-11-13 RX ADMIN — OXYCODONE HYDROCHLORIDE AND ACETAMINOPHEN 1 TABLET: 5; 325 TABLET ORAL at 18:16

## 2023-11-13 RX ADMIN — IBUPROFEN 600 MG: 600 TABLET, FILM COATED ORAL at 16:34

## 2023-11-13 ASSESSMENT — PAIN SCALES - GENERAL: PAINLEVEL_OUTOF10: 10

## 2023-11-13 ASSESSMENT — PAIN - FUNCTIONAL ASSESSMENT: PAIN_FUNCTIONAL_ASSESSMENT: 0-10

## 2023-11-13 NOTE — ED TRIAGE NOTES
Pt endorses fracture and splint to left arm 6 months ago.  Pt endorses onset of of heaviness to right arm,  neck pain and shoulder pain x 1 week

## 2023-11-13 NOTE — ED PROVIDER NOTES
[facility] EMERGENCY DEPT  EMERGENCY DEPARTMENT HISTORY AND PHYSICAL EXAM      Date: 11/13/2023  Patient Name: Lexi Mejia  MRN: [id number]  [address] 1985  Date of evaluation: 11/13/2023  Provider: SHILPI Rose NP   Note Started: 4:23 PM EST 11/13/23    HISTORY OF PRESENT ILLNESS     Chief Complaint   Patient presents with    Neck Pain    Arm Pain       History Provided By: Patient    HPI: Lexi Mejia is a 45 y.o. male past medical history of left arm fracture followed by orthopedics presents complaining of left arm and neck and shoulder pain for the past week above his baseline pain. He denies any new injuries but does report that he intermittently has these problems. He was in physical therapy and he reports that he has had some atrophy of his muscles that is a known problem and decreased strength from being in the splint. He is unable to perform his work duties and is on light duty. He also states that his left side ribs occasionally hurt as well and believes it is also muscular. He denies any numbness or tingling to his arm, chest pain or shortness of breath, his range of motion is intact but he self limits due to pain. Pain is mostly on palpation. Has not taken any medications for it. PAST MEDICAL HISTORY   Past Medical History:  Past Medical History:   Diagnosis Date    Arm fracture        Past Surgical History:  History reviewed. No pertinent surgical history. Family History:  History reviewed. No pertinent family history.     Social History:  Social History     Tobacco Use    Smoking status: Former    Smokeless tobacco: Never   Substance Use Topics    Alcohol use: No    Drug use: Yes     Types: Marijuana Laurie Sella)       Allergies:  No Known Allergies    PCP: Nakita Heredia MD    Current Meds:   Current Facility-Administered Medications   Medication Dose Route Frequency Provider Last Rate Last Admin    lidocaine 4 % external patch 1 patch  1 patch TransDERmal NOW Nadia Robertson

## 2024-08-27 ENCOUNTER — HOSPITAL ENCOUNTER (EMERGENCY)
Facility: HOSPITAL | Age: 39
Discharge: HOME | End: 2024-08-27

## 2024-08-27 ENCOUNTER — HOSPITAL ENCOUNTER (EMERGENCY)
Facility: HOSPITAL | Age: 39
Discharge: HOME | End: 2024-08-27
Attending: EMERGENCY MEDICINE
Payer: COMMERCIAL

## 2024-08-27 VITALS
DIASTOLIC BLOOD PRESSURE: 62 MMHG | WEIGHT: 162 LBS | RESPIRATION RATE: 18 BRPM | SYSTOLIC BLOOD PRESSURE: 97 MMHG | HEIGHT: 72 IN | OXYGEN SATURATION: 97 % | BODY MASS INDEX: 21.94 KG/M2 | HEART RATE: 65 BPM | TEMPERATURE: 99.3 F

## 2024-08-27 VITALS
SYSTOLIC BLOOD PRESSURE: 132 MMHG | WEIGHT: 162.4 LBS | TEMPERATURE: 98.2 F | DIASTOLIC BLOOD PRESSURE: 59 MMHG | BODY MASS INDEX: 22 KG/M2 | OXYGEN SATURATION: 99 % | HEART RATE: 73 BPM | RESPIRATION RATE: 18 BRPM | HEIGHT: 72 IN

## 2024-08-27 DIAGNOSIS — M54.12 CERVICAL RADICULOPATHY: Primary | ICD-10-CM

## 2024-08-27 DIAGNOSIS — M62.838 SPASM OF MUSCLE: ICD-10-CM

## 2024-08-27 PROCEDURE — 6370000000 HC RX 637 (ALT 250 FOR IP)

## 2024-08-27 PROCEDURE — 99284 EMERGENCY DEPT VISIT MOD MDM: CPT

## 2024-08-27 PROCEDURE — 96372 THER/PROPH/DIAG INJ SC/IM: CPT

## 2024-08-27 PROCEDURE — 6360000002 HC RX W HCPCS

## 2024-08-27 RX ORDER — KETOROLAC TROMETHAMINE 30 MG/ML
30 INJECTION, SOLUTION INTRAMUSCULAR; INTRAVENOUS
Status: COMPLETED | OUTPATIENT
Start: 2024-08-27 | End: 2024-08-27

## 2024-08-27 RX ORDER — PREDNISONE 20 MG/1
60 TABLET ORAL
Status: COMPLETED | OUTPATIENT
Start: 2024-08-27 | End: 2024-08-27

## 2024-08-27 RX ORDER — METHYLPREDNISOLONE 4 MG
TABLET, DOSE PACK ORAL
Qty: 1 KIT | Refills: 0 | Status: SHIPPED | OUTPATIENT
Start: 2024-08-27

## 2024-08-27 RX ORDER — LIDOCAINE 50 MG/G
1 PATCH TOPICAL DAILY
Qty: 7 PATCH | Refills: 0 | Status: SHIPPED | OUTPATIENT
Start: 2024-08-27 | End: 2024-09-03

## 2024-08-27 RX ORDER — METHOCARBAMOL 750 MG/1
750 TABLET, FILM COATED ORAL 4 TIMES DAILY
Qty: 20 TABLET | Refills: 0 | Status: SHIPPED | OUTPATIENT
Start: 2024-08-27 | End: 2024-09-01

## 2024-08-27 RX ORDER — METHOCARBAMOL 500 MG/1
750 TABLET, FILM COATED ORAL
Status: COMPLETED | OUTPATIENT
Start: 2024-08-27 | End: 2024-08-27

## 2024-08-27 RX ADMIN — KETOROLAC TROMETHAMINE 30 MG: 30 INJECTION, SOLUTION INTRAMUSCULAR at 13:07

## 2024-08-27 RX ADMIN — PREDNISONE 60 MG: 20 TABLET ORAL at 13:07

## 2024-08-27 RX ADMIN — METHOCARBAMOL TABLETS 750 MG: 500 TABLET, COATED ORAL at 13:07

## 2024-08-27 ASSESSMENT — PAIN DESCRIPTION - DESCRIPTORS
DESCRIPTORS: SPASM;SHARP;ACHING
DESCRIPTORS: ACHING
DESCRIPTORS: ACHING

## 2024-08-27 ASSESSMENT — PAIN - FUNCTIONAL ASSESSMENT
PAIN_FUNCTIONAL_ASSESSMENT: 0-10
PAIN_FUNCTIONAL_ASSESSMENT: 0-10
PAIN_FUNCTIONAL_ASSESSMENT: PREVENTS OR INTERFERES WITH ALL ACTIVE AND SOME PASSIVE ACTIVITIES

## 2024-08-27 ASSESSMENT — PAIN DESCRIPTION - LOCATION
LOCATION: BACK
LOCATION: ARM
LOCATION: ARM;SHOULDER;BACK

## 2024-08-27 ASSESSMENT — PAIN SCALES - GENERAL
PAINLEVEL_OUTOF10: 10

## 2024-08-27 ASSESSMENT — LIFESTYLE VARIABLES
HOW MANY STANDARD DRINKS CONTAINING ALCOHOL DO YOU HAVE ON A TYPICAL DAY: 1 OR 2
HOW OFTEN DO YOU HAVE A DRINK CONTAINING ALCOHOL: 2-4 TIMES A MONTH

## 2024-08-27 ASSESSMENT — PAIN DESCRIPTION - ORIENTATION
ORIENTATION: LEFT
ORIENTATION: LEFT

## 2024-08-27 NOTE — ED TRIAGE NOTES
PT ambulatory to ED with complaints of L arm, back and leg pain that started this morning. PT denies injury. PT reports it feels like spasms.

## 2024-08-27 NOTE — ED TRIAGE NOTES
Patient reporting left sided arm, shoulder, and back pain/spasms that started around 3 am.     History of left broken arm last year.

## 2024-08-27 NOTE — ED PROVIDER NOTES
[facility] EMERGENCY DEPT  EMERGENCY DEPARTMENT ENCOUNTER      Pt Name: Mathew Cummings  MRN: [id number]  Birthdate 1985  Date of evaluation: 8/27/2024  Provider: Isaiah Kumar PA-C    CHIEF COMPLAINT       Chief Complaint   Patient presents with    Back Pain    Arm Pain         HISTORY OF PRESENT ILLNESS   (Location/Symptom, Timing/Onset, Context/Setting, Quality, Duration, Modifying Factors, Severity)  Note limiting factors.   39-year-old male with no significant past medical history presents with complaint of left neck/shoulder/arm pain.  Patient reports that about a year ago, he broke his left forearm.  Ever since then, he has been struggling with muscle spasms on the left side.  He reports that this most recent flare started up this morning.  He reports to feeling stiffness in the left side of his neck that goes into his upper arm.  Denies any new falls or injuries.  Denies numbness or tingling.  No chest pain or shortness of breath.            Review of External Medical Records:     Nursing Notes were reviewed.    REVIEW OF SYSTEMS    (2-9 systems for level 4, 10 or more for level 5)     Review of Systems    Except as noted above the remainder of the review of systems was reviewed and negative.       PAST MEDICAL HISTORY     Past Medical History:   Diagnosis Date    Arm fracture          SURGICAL HISTORY     History reviewed. No pertinent surgical history.      CURRENT MEDICATIONS       Discharge Medication List as of 8/27/2024  1:14 PM        CONTINUE these medications which have NOT CHANGED    Details   albuterol sulfate HFA (PROVENTIL;VENTOLIN;PROAIR) 108 (90 Base) MCG/ACT inhaler Inhale 2 puffs into the lungs every 6 hours as neededHistorical Med             ALLERGIES     Patient has no known allergies.    FAMILY HISTORY     History reviewed. No pertinent family history.       SOCIAL HISTORY       Social History     Socioeconomic History    Marital status:      Spouse name: None    Number of

## 2024-08-27 NOTE — ED NOTES
Pt provided discharge instructions, prescriptions, education and follow up information. Pt verbalizing understanding. Pt A&Ox4,breathing unlabored on RA. Pain controlled. Patient ambulatory out of ER.